# Patient Record
Sex: FEMALE | Race: WHITE | NOT HISPANIC OR LATINO | Employment: PART TIME | URBAN - METROPOLITAN AREA
[De-identification: names, ages, dates, MRNs, and addresses within clinical notes are randomized per-mention and may not be internally consistent; named-entity substitution may affect disease eponyms.]

---

## 2020-06-08 ENCOUNTER — TELEPHONE (OUTPATIENT)
Dept: PODIATRY | Facility: CLINIC | Age: 70
End: 2020-06-08

## 2020-06-08 NOTE — TELEPHONE ENCOUNTER
----- Message from Natasha Arias sent at 6/8/2020  1:37 PM CDT -----  Contact: patient  Type: Needs Medical Advice  Who Called:  Patient  Best Call Back Number: [phone]  Additional Information: Patient states that she is sending a referral to office for new patient apt.  Please call to advise.  Thanks!

## 2020-06-16 ENCOUNTER — OFFICE VISIT (OUTPATIENT)
Dept: PODIATRY | Facility: CLINIC | Age: 70
End: 2020-06-16
Payer: MEDICARE

## 2020-06-16 VITALS
DIASTOLIC BLOOD PRESSURE: 77 MMHG | RESPIRATION RATE: 18 BRPM | SYSTOLIC BLOOD PRESSURE: 131 MMHG | HEIGHT: 65 IN | HEART RATE: 62 BPM | TEMPERATURE: 99 F | WEIGHT: 209.63 LBS | OXYGEN SATURATION: 97 % | BODY MASS INDEX: 34.93 KG/M2

## 2020-06-16 DIAGNOSIS — L60.8 ACQUIRED DYSMORPHIC TOENAIL: ICD-10-CM

## 2020-06-16 DIAGNOSIS — R73.03 PREDIABETES: Primary | ICD-10-CM

## 2020-06-16 DIAGNOSIS — R20.8 LOSS OF PROTECTIVE SENSATION OF SKIN OF FOOT: ICD-10-CM

## 2020-06-16 DIAGNOSIS — M79.675 PAIN OF LEFT GREAT TOE: ICD-10-CM

## 2020-06-16 PROCEDURE — 1101F PT FALLS ASSESS-DOCD LE1/YR: CPT | Mod: CPTII,S$GLB,, | Performed by: PODIATRIST

## 2020-06-16 PROCEDURE — 1159F MED LIST DOCD IN RCRD: CPT | Mod: S$GLB,,, | Performed by: PODIATRIST

## 2020-06-16 PROCEDURE — 99203 PR OFFICE/OUTPT VISIT, NEW, LEVL III, 30-44 MIN: ICD-10-PCS | Mod: S$GLB,,, | Performed by: PODIATRIST

## 2020-06-16 PROCEDURE — 1126F AMNT PAIN NOTED NONE PRSNT: CPT | Mod: S$GLB,,, | Performed by: PODIATRIST

## 2020-06-16 PROCEDURE — 99999 PR PBB SHADOW E&M-EST. PATIENT-LVL IV: ICD-10-PCS | Mod: PBBFAC,,, | Performed by: PODIATRIST

## 2020-06-16 PROCEDURE — 99203 OFFICE O/P NEW LOW 30 MIN: CPT | Mod: S$GLB,,, | Performed by: PODIATRIST

## 2020-06-16 PROCEDURE — 1159F PR MEDICATION LIST DOCUMENTED IN MEDICAL RECORD: ICD-10-PCS | Mod: S$GLB,,, | Performed by: PODIATRIST

## 2020-06-16 PROCEDURE — 1126F PR PAIN SEVERITY QUANTIFIED, NO PAIN PRESENT: ICD-10-PCS | Mod: S$GLB,,, | Performed by: PODIATRIST

## 2020-06-16 PROCEDURE — 99999 PR PBB SHADOW E&M-EST. PATIENT-LVL IV: CPT | Mod: PBBFAC,,, | Performed by: PODIATRIST

## 2020-06-16 PROCEDURE — 1101F PR PT FALLS ASSESS DOC 0-1 FALLS W/OUT INJ PAST YR: ICD-10-PCS | Mod: CPTII,S$GLB,, | Performed by: PODIATRIST

## 2020-06-16 RX ORDER — MELOXICAM 15 MG/1
15 TABLET ORAL DAILY
COMMUNITY
End: 2023-08-10

## 2020-06-16 RX ORDER — OMEPRAZOLE 40 MG/1
40 CAPSULE, DELAYED RELEASE ORAL DAILY
COMMUNITY

## 2020-06-16 RX ORDER — CARBIDOPA AND LEVODOPA 25; 100 MG/1; MG/1
TABLET ORAL
COMMUNITY
Start: 2020-04-13

## 2020-06-16 RX ORDER — LANOLIN ALCOHOL/MO/W.PET/CERES
1 CREAM (GRAM) TOPICAL 2 TIMES DAILY
COMMUNITY

## 2020-06-16 RX ORDER — METOPROLOL TARTRATE 50 MG/1
TABLET ORAL
COMMUNITY
Start: 2020-04-13

## 2020-06-16 RX ORDER — MONTELUKAST SODIUM 10 MG/1
10 TABLET ORAL NIGHTLY
COMMUNITY
End: 2023-08-10

## 2020-06-16 RX ORDER — PROMETHAZINE HYDROCHLORIDE AND DEXTROMETHORPHAN HYDROBROMIDE 6.25; 15 MG/5ML; MG/5ML
SYRUP ORAL
COMMUNITY
End: 2023-08-10

## 2020-06-16 RX ORDER — CHOLECALCIFEROL (VITAMIN D3) 25 MCG
5000 TABLET ORAL DAILY
COMMUNITY

## 2020-06-16 RX ORDER — LOSARTAN POTASSIUM AND HYDROCHLOROTHIAZIDE 12.5; 1 MG/1; MG/1
TABLET ORAL
COMMUNITY
Start: 2020-06-08

## 2020-06-16 RX ORDER — SIMVASTATIN 20 MG/1
20 TABLET, FILM COATED ORAL NIGHTLY
COMMUNITY
End: 2023-08-10

## 2020-06-16 NOTE — LETTER
June 17, 2020      Nasrin Woodson MD  [address]  [place]  [facility] [place] [address]           [facility]  [address] [facility] [place] [address]  Phone: [phone]  Fax: [phone]          Patient: Katrin Gomez   MR Number: [id number]   YOB: 1950   Date of Visit: 6/16/2020       Dear Dr. Nasrin Woodson:    Thank you for referring Katrin Gomez to me for evaluation. Attached you will find relevant portions of my assessment and plan of care.    If you have questions, please do not hesitate to call me. I look forward to following Katrin Gomez along with you.    Sincerely,    Tracee Sims, DPM    Enclosure  CC:  No Recipients    If you would like to receive this communication electronically, please contact [email or web address] or [phone] to request more information on [employer] Link access.    For providers and/or their staff who would like to refer a patient to [facility], please contact us through our one-stop-shop provider referral line, [facility], at [phone].    If you feel you have received this communication in error or would no longer like to receive these types of communications, please e-mail [email or web address]

## 2020-06-17 NOTE — PROGRESS NOTES
Subjective:       Patient ID: Katrin Gomez is a 70 y.o. female.    Chief Complaint: Diabetes Mellitus  Patient presents via referral Dr. Woodson for diabetic foot exam and evaluation of fungal nails.    Has a history of injury right great toe, steel pipe fell on it > 20 years ago. Reports having ingrown nails cut out both sides both big toes 13 years ago.  Reports nail on the right great toe has never grown in properly, will have pain in this area from time to time.  Patient reports  Dr. Chintan kim has diagnosed her as prediabetic, watching diet,  blood work, no medication at this time.  Denies burning or tingling in feet but does have a history of spinal stenosis, sciatica on the left side,  2nd and 3rd digits always feel numb left foot.      Past Medical History:   Diagnosis Date    Abnormal glucose     Hyperlipidemia     Hypertension     Low serum vitamin D     Lumbar radiculitis     Chronic    Osteopenia     Prediabetes     Restless leg syndrome     Spinal stenosis      Past Surgical History:   Procedure Laterality Date    TUMOR REMOVAL      40 years ago     Family History   Problem Relation Age of Onset    Hypertension Mother     Diabetes Mother     Hypertension Father     Diabetes Father      Social History     Socioeconomic History    Marital status:      Spouse name: Not on file    Number of children: Not on file    Years of education: Not on file    Highest education level: Not on file   Occupational History    Occupation: [employer]    Social Needs    Financial resource strain: Not on file    Food insecurity     Worry: Not on file     Inability: Not on file    Transportation needs     Medical: Not on file     Non-medical: Not on file   Tobacco Use    Smoking status: Former Smoker    Smokeless tobacco: Never Used   Substance and Sexual Activity    Alcohol use: Not Currently    Drug use: Never    Sexual activity: Not Currently   Lifestyle    Physical activity     Days per  "week: Not on file     Minutes per session: Not on file    Stress: Not on file   Relationships    Social connections     Talks on phone: Not on file     Gets together: Not on file     Attends Muslim service: Not on file     Active member of club or organization: Not on file     Attends meetings of clubs or organizations: Not on file     Relationship status: Not on file   Other Topics Concern    Not on file   Social History Narrative    Not on file       Current Outpatient Medications   Medication Sig Dispense Refill    calcium citrate-vitamin D3 315-200 mg (CITRACAL+D) 315-200 mg-unit per tablet Take 1 tablet by mouth 2 (two) times daily.      carbidopa-levodopa  mg (SINEMET)  mg per tablet       losartan-hydrochlorothiazide 100-12.5 mg (HYZAAR) 100-12.5 mg Tab       meloxicam (MOBIC) 15 MG tablet Take 15 mg by mouth once daily.      metoprolol tartrate (LOPRESSOR) 50 MG tablet       montelukast (SINGULAIR) 10 mg tablet Take 10 mg by mouth every evening.      omeprazole (PRILOSEC) 40 MG capsule Take 40 mg by mouth once daily. Delayed release capsule      promethazine-dextromethorphan (PROMETHAZINE-DM) 6.25-15 mg/5 mL Syrp Take by mouth.      simvastatin (ZOCOR) 20 MG tablet Take 20 mg by mouth every evening.      vitamin D (VITAMIN D3) 1000 units Tab Take 5,000 Units by mouth once daily.       No current facility-administered medications for this visit.      Review of patient's allergies indicates:  No Known Allergies    Review of Systems   Constitutional: Negative for fever.   HENT: Negative for congestion.    Respiratory: Negative for cough and shortness of breath.    Cardiovascular: Negative for leg swelling.   Musculoskeletal: Negative for gait problem.   All other systems reviewed and are negative.      Objective:      Vitals:    06/16/20 1151   BP: 131/77   Pulse: 62   Resp: 18   Temp: 98.7 °F (37.1 °C)   TempSrc: Oral   SpO2: 97%   Weight: 95.1 kg (209 lb 9.6 oz)   Height: 5' 5" " (1.651 m)     Physical Exam  Vitals signs and nursing note reviewed.   Constitutional:       General: She is not in acute distress.     Appearance: Normal appearance.   Cardiovascular:      Pulses:           Dorsalis pedis pulses are 2+ on the right side and 2+ on the left side.        Posterior tibial pulses are 2+ on the right side and 2+ on the left side.   Pulmonary:      Effort: Pulmonary effort is normal.   Musculoskeletal:      Right foot: Normal range of motion. No deformity.      Left foot: Normal range of motion. No deformity.   Feet:      Right foot:      Protective Sensation: 6 sites tested. 6 sites sensed.      Skin integrity: No skin breakdown, erythema, callus or dry skin.      Toenail Condition: Right toenails are abnormally thick.      Left foot:      Protective Sensation: 6 sites tested. 4 sites sensed.      Skin integrity: No skin breakdown, erythema, callus or dry skin.   Skin:     Capillary Refill: Capillary refill takes 2 to 3 seconds.   Psychiatric:         Behavior: Behavior normal.       Vascular         Normal CFT bilateral   No lower extremity edema bilateral   Pedal skin temperature and color are normal bilateral     Integumentary   Damaged, tented, dystrophic and irregular nail right hallux, tender upon palpation due to thickness.  Upon reducing thickness no evidence of infection.  Similar condition of the left hallux, not as severe,  better shape to the nail plate, no pain       Soft skin texture  No skin breaks, bruises, abrasions bilateral feet    Neurological   Gross sensation intact in all areas bilateral feet with monofilament testing with exception of 2nd and 3rd digits left foot.  Consistent with spinal stenosis, sciatica and lumbar radiculopathy     Musculoskeletal   Muscle Strength/Testing and Tone:  Intact, normal tone bilateral   Joints, Bones, and Muscles: Normal with normal ROM for age        Walks well unassisted        Presents in appropriate shoes       Assessment:        1. Prediabetes    2. Loss of protective sensation of skin of foot - Left Foot    3. Pain of left great toe    4. Acquired dysmorphic toenail - Right Foot        Plan:         Diabetic pedal exam performed.    Reviewed diabetic education.   Reviewed results monofilament testing, lack of sensation 2nd 3rd digit left foot.  Advised patient this is definitely back related, loss of sensation due to diabetes typically starts in the big toes, throughout digits and works up the foot to the lower legs slowly.  Discussed benefit of tight control of glucose/diabetes,  diet, exercise, weight loss to avoid taking medication and to prevent any further progression regarding neuropathy or loss of sensation to feet  Reviewed appropriate shoes,  especially indoors to protect feet, no flat shoes, slippers or walking in sock or bare feet.   Discussed maintenance of skin and nails.  Reviewed damaged, dystrophic and irregular shaped nail plate right hallux which puts her at risk for subungual abscess as well as ingrown nail.  Thickness was reduced and advised patient on how to maintain nail better to prevent recurrence and pain.  We discussed topicals to help soften the nail to prevent pain and trim nail easier.  Would recommend on the left great toe even though it is not as severe.    Reviewed need for daily foot checks and instructed patient to contact the office with any area of redness or swelling which has not improved within 3 days.  Patient was in understanding and agreement with treatment plan.  I counseled the patient on their conditions, implications and medical management.  Instructed patient/family to contact the office with any changes, questions, concerns, worsening of symptoms.   Total face-to-face time, exam, assessment, treatment, discussion, documentation 30 minutes, more than half this time spent on consultation and coordination of care.  Follow up as needed    This note was created using M[employer] voice recognition  software that occasionally misinterpreted phrases or words.

## 2021-03-08 ENCOUNTER — IMMUNIZATION (OUTPATIENT)
Dept: FAMILY MEDICINE | Facility: CLINIC | Age: 71
End: 2021-03-08
Payer: MEDICARE

## 2021-03-08 DIAGNOSIS — Z23 NEED FOR VACCINATION: Primary | ICD-10-CM

## 2021-03-08 PROCEDURE — 91301 COVID-19, MRNA, LNP-S, PF, 100 MCG/0.5 ML DOSE VACCINE: ICD-10-PCS | Mod: S$GLB,,, | Performed by: FAMILY MEDICINE

## 2021-03-08 PROCEDURE — 0011A COVID-19, MRNA, LNP-S, PF, 100 MCG/0.5 ML DOSE VACCINE: ICD-10-PCS | Mod: CV19,S$GLB,, | Performed by: FAMILY MEDICINE

## 2021-03-08 PROCEDURE — 91301 COVID-19, MRNA, LNP-S, PF, 100 MCG/0.5 ML DOSE VACCINE: CPT | Mod: S$GLB,,, | Performed by: FAMILY MEDICINE

## 2021-03-08 PROCEDURE — 0011A COVID-19, MRNA, LNP-S, PF, 100 MCG/0.5 ML DOSE VACCINE: CPT | Mod: CV19,S$GLB,, | Performed by: FAMILY MEDICINE

## 2021-04-05 ENCOUNTER — IMMUNIZATION (OUTPATIENT)
Dept: FAMILY MEDICINE | Facility: CLINIC | Age: 71
End: 2021-04-05
Payer: MEDICARE

## 2021-04-05 DIAGNOSIS — Z23 NEED FOR VACCINATION: Primary | ICD-10-CM

## 2021-04-05 PROCEDURE — 0012A COVID-19, MRNA, LNP-S, PF, 100 MCG/0.5 ML DOSE VACCINE: ICD-10-PCS | Mod: CV19,S$GLB,, | Performed by: FAMILY MEDICINE

## 2021-04-05 PROCEDURE — 0012A COVID-19, MRNA, LNP-S, PF, 100 MCG/0.5 ML DOSE VACCINE: CPT | Mod: CV19,S$GLB,, | Performed by: FAMILY MEDICINE

## 2021-04-05 PROCEDURE — 91301 COVID-19, MRNA, LNP-S, PF, 100 MCG/0.5 ML DOSE VACCINE: CPT | Mod: S$GLB,,, | Performed by: FAMILY MEDICINE

## 2021-04-05 PROCEDURE — 91301 COVID-19, MRNA, LNP-S, PF, 100 MCG/0.5 ML DOSE VACCINE: ICD-10-PCS | Mod: S$GLB,,, | Performed by: FAMILY MEDICINE

## 2022-02-28 ENCOUNTER — IMMUNIZATION (OUTPATIENT)
Dept: FAMILY MEDICINE | Facility: CLINIC | Age: 72
End: 2022-02-28
Payer: MEDICARE

## 2022-02-28 DIAGNOSIS — Z23 NEED FOR VACCINATION: Primary | ICD-10-CM

## 2022-02-28 PROCEDURE — 91306 COVID-19, MRNA, LNP-S, PF, 100 MCG/0.25 ML DOSE VACCINE (MODERNA BOOSTER): CPT | Mod: PBBFAC | Performed by: FAMILY MEDICINE

## 2022-02-28 PROCEDURE — 0064A COVID-19, MRNA, LNP-S, PF, 100 MCG/0.25 ML DOSE VACCINE (MODERNA BOOSTER): CPT | Mod: PBBFAC | Performed by: FAMILY MEDICINE

## 2022-02-28 NOTE — PROGRESS NOTES
Katrin Gomez arrive to clinic awake and alert.  Pt verified name and .   Allergies reviewed with patient.  Pt given Moderna 0.25mg LD  per provider orders.      Denied further needs.    Patient instructed to wait 15 mins after injection.   Patient states no vaccines in the last 14 days.  Vaccine information sheet was given to patient. Patient voiced understanding.    Luna Cody, SURESHN

## 2023-08-08 ENCOUNTER — HOSPITAL ENCOUNTER (OUTPATIENT)
Dept: RADIOLOGY | Facility: HOSPITAL | Age: 73
Discharge: HOME | End: 2023-08-08
Attending: PODIATRIST
Payer: MEDICARE

## 2023-08-08 ENCOUNTER — OFFICE VISIT (OUTPATIENT)
Dept: PODIATRY | Facility: CLINIC | Age: 73
End: 2023-08-08
Payer: MEDICARE

## 2023-08-08 VITALS
BODY MASS INDEX: 33.66 KG/M2 | WEIGHT: 202 LBS | HEIGHT: 65 IN | HEART RATE: 62 BPM | RESPIRATION RATE: 18 BRPM | SYSTOLIC BLOOD PRESSURE: 112 MMHG | DIASTOLIC BLOOD PRESSURE: 73 MMHG

## 2023-08-08 DIAGNOSIS — M25.572 LEFT LATERAL ANKLE PAIN: Primary | ICD-10-CM

## 2023-08-08 DIAGNOSIS — M25.572 LEFT LATERAL ANKLE PAIN: ICD-10-CM

## 2023-08-08 DIAGNOSIS — S90.222S: ICD-10-CM

## 2023-08-08 DIAGNOSIS — M20.41 HAMMERTOE OF SECOND TOE OF RIGHT FOOT: ICD-10-CM

## 2023-08-08 PROCEDURE — 3078F DIAST BP <80 MM HG: CPT | Mod: CPTII,S$GLB,, | Performed by: PODIATRIST

## 2023-08-08 PROCEDURE — 73610 XR ANKLE COMPLETE 3 VIEW LEFT: ICD-10-PCS | Mod: 26,LT,, | Performed by: RADIOLOGY

## 2023-08-08 PROCEDURE — 99214 PR OFFICE/OUTPT VISIT, EST, LEVL IV, 30-39 MIN: ICD-10-PCS | Mod: S$GLB,,, | Performed by: PODIATRIST

## 2023-08-08 PROCEDURE — 99999 PR PBB SHADOW E&M-EST. PATIENT-LVL IV: CPT | Mod: PBBFAC,,, | Performed by: PODIATRIST

## 2023-08-08 PROCEDURE — 3078F PR MOST RECENT DIASTOLIC BLOOD PRESSURE < 80 MM HG: ICD-10-PCS | Mod: CPTII,S$GLB,, | Performed by: PODIATRIST

## 2023-08-08 PROCEDURE — 73610 X-RAY EXAM OF ANKLE: CPT | Mod: 26,LT,, | Performed by: RADIOLOGY

## 2023-08-08 PROCEDURE — 1159F MED LIST DOCD IN RCRD: CPT | Mod: CPTII,S$GLB,, | Performed by: PODIATRIST

## 2023-08-08 PROCEDURE — 73610 X-RAY EXAM OF ANKLE: CPT | Mod: TC,LT

## 2023-08-08 PROCEDURE — 1159F PR MEDICATION LIST DOCUMENTED IN MEDICAL RECORD: ICD-10-PCS | Mod: CPTII,S$GLB,, | Performed by: PODIATRIST

## 2023-08-08 PROCEDURE — 3074F PR MOST RECENT SYSTOLIC BLOOD PRESSURE < 130 MM HG: ICD-10-PCS | Mod: CPTII,S$GLB,, | Performed by: PODIATRIST

## 2023-08-08 PROCEDURE — 3008F PR BODY MASS INDEX (BMI) DOCUMENTED: ICD-10-PCS | Mod: CPTII,S$GLB,, | Performed by: PODIATRIST

## 2023-08-08 PROCEDURE — 99999 PR PBB SHADOW E&M-EST. PATIENT-LVL IV: ICD-10-PCS | Mod: PBBFAC,,, | Performed by: PODIATRIST

## 2023-08-08 PROCEDURE — 99214 OFFICE O/P EST MOD 30 MIN: CPT | Mod: S$GLB,,, | Performed by: PODIATRIST

## 2023-08-08 PROCEDURE — 3074F SYST BP LT 130 MM HG: CPT | Mod: CPTII,S$GLB,, | Performed by: PODIATRIST

## 2023-08-08 PROCEDURE — 1125F PR PAIN SEVERITY QUANTIFIED, PAIN PRESENT: ICD-10-PCS | Mod: CPTII,S$GLB,, | Performed by: PODIATRIST

## 2023-08-08 PROCEDURE — 1125F AMNT PAIN NOTED PAIN PRSNT: CPT | Mod: CPTII,S$GLB,, | Performed by: PODIATRIST

## 2023-08-08 PROCEDURE — 3008F BODY MASS INDEX DOCD: CPT | Mod: CPTII,S$GLB,, | Performed by: PODIATRIST

## 2023-08-08 RX ORDER — GABAPENTIN 300 MG/1
300 CAPSULE ORAL
COMMUNITY
Start: 2021-10-25 | End: 2023-08-10

## 2023-08-08 RX ORDER — ALBUTEROL SULFATE 90 UG/1
2 AEROSOL, METERED RESPIRATORY (INHALATION)
COMMUNITY
Start: 2022-10-25

## 2023-08-08 RX ORDER — ALBUTEROL SULFATE 0.83 MG/ML
2.5 SOLUTION RESPIRATORY (INHALATION)
COMMUNITY
Start: 2023-06-13 | End: 2023-08-10

## 2023-08-08 RX ORDER — ROSUVASTATIN CALCIUM 5 MG/1
TABLET, COATED ORAL
COMMUNITY
Start: 2023-06-15 | End: 2023-08-10

## 2023-08-08 RX ORDER — FLUCONAZOLE 200 MG/1
TABLET ORAL
COMMUNITY
Start: 2023-06-13 | End: 2023-08-10

## 2023-08-08 RX ORDER — PRAMIPEXOLE DIHYDROCHLORIDE 0.5 MG/1
TABLET ORAL
COMMUNITY
Start: 2023-07-03

## 2023-08-08 RX ORDER — CLOTRIMAZOLE AND BETAMETHASONE DIPROPIONATE 10; .64 MG/G; MG/G
CREAM TOPICAL
COMMUNITY
Start: 2022-12-01 | End: 2023-08-10

## 2023-08-08 RX ORDER — DICLOFENAC SODIUM 75 MG/1
TABLET, DELAYED RELEASE ORAL
COMMUNITY
Start: 2021-12-29

## 2023-08-08 RX ORDER — ALBUTEROL SULFATE 0.83 MG/ML
SOLUTION RESPIRATORY (INHALATION)
COMMUNITY
Start: 2023-06-13 | End: 2023-08-10

## 2023-08-08 RX ORDER — DICLOFENAC SODIUM 75 MG/1
75 TABLET, DELAYED RELEASE ORAL 2 TIMES DAILY
COMMUNITY
Start: 2023-06-19 | End: 2023-08-10

## 2023-08-10 NOTE — PROGRESS NOTES
Subjective:       Patient ID: Katrin Gomez is a 73 y.o. female.    Chief Complaint: Ankle Pain, Nail Problem, and Callouses  Patient presents with complaint of pain on the outside of the left ankle with swelling that has been progressing.  With history of osteopenia she is concerned regarding fracture.  Relates history of pre diabetes although she does confirm neuropathy with a history of spinal stenosis and severe degenerative arthritis of her knees.  Patient inquires about very bruised left great toenail, no drainage or pain.  Pain level left ankle 6/10      Past Medical History:   Diagnosis Date    Abnormal glucose     Hyperlipidemia     Hypertension     Low serum vitamin D     Lumbar radiculitis     Chronic    Osteopenia     Prediabetes     Restless leg syndrome     Spinal stenosis      Past Surgical History:   Procedure Laterality Date    TUMOR REMOVAL      40 years ago     Family History   Problem Relation Age of Onset    Hypertension Mother     Diabetes Mother     Hypertension Father     Diabetes Father      Social History     Socioeconomic History    Marital status:    Occupational History    Occupation: [employer]    Tobacco Use    Smoking status: Former     Current packs/day: 0.00    Smokeless tobacco: Never   Substance and Sexual Activity    Alcohol use: Not Currently    Drug use: Never    Sexual activity: Not Currently       Current Outpatient Medications   Medication Sig Dispense Refill    albuterol (VENTOLIN HFA) 90 mcg/actuation inhaler 2 puffs.      calcium citrate-vitamin D3 315-200 mg (CITRACAL+D) 315-200 mg-unit per tablet Take 1 tablet by mouth 2 (two) times daily.      carbidopa-levodopa  mg (SINEMET)  mg per tablet       diclofenac (VOLTAREN) 75 MG EC tablet   = 1 tab, Oral, BID with meals, PRN baaaaack and joint pain, # 180 tab, 3 Refill(s), other reason (Rx)      losartan-hydrochlorothiazide 100-12.5 mg (HYZAAR) 100-12.5 mg Tab       metoprolol tartrate (LOPRESSOR) 50 MG  "tablet       omeprazole (PRILOSEC) 40 MG capsule Take 40 mg by mouth once daily. Delayed release capsule      pramipexole (MIRAPEX) 0.5 MG tablet Take by mouth.      vitamin D (VITAMIN D3) 1000 units Tab Take 5,000 Units by mouth once daily.      walker (ULTRA-LIGHT ROLLATOR MISC)   rollator with wheels, See Instructions, needs  walker for  gait instabilty, # 1 EA, 0 Refill(s)       No current facility-administered medications for this visit.     Review of patient's allergies indicates:  No Known Allergies    Review of Systems   Cardiovascular:  Negative for leg swelling.   Musculoskeletal:  Negative for gait problem.   All other systems reviewed and are negative.      Objective:      Vitals:    08/08/23 0932   BP: 112/73   Pulse: 62   Resp: 18   Weight: 91.6 kg (202 lb)   Height: 5' 5" (1.651 m)     Physical Exam  Vitals and nursing note reviewed.   Constitutional:       General: She is not in acute distress.     Appearance: Normal appearance.   Cardiovascular:      Pulses:           Dorsalis pedis pulses are 2+ on the right side and 2+ on the left side.        Posterior tibial pulses are 2+ on the right side and 2+ on the left side.   Pulmonary:      Effort: Pulmonary effort is normal.   Musculoskeletal:         General: Tenderness present.      Left foot: Deformity (mild hammertoe 2nd right) present.      Comments: Diffuse lateral left ankle pain, minimal edema,a no erythema or calor   Feet:      Right foot:      Skin integrity: No skin breakdown (healing skin dorsal 2nd digit right).      Left foot:      Skin integrity: No erythema (bruising with fungal involvement, partially black left hallux nail, no infection, no pain).      Toenail Condition: Left toenails are abnormally thick. Fungal disease present.  Skin:     Capillary Refill: Capillary refill takes 2 to 3 seconds.   Neurological:      Mental Status: She is alert.   Psychiatric:         Behavior: Behavior normal.         Thought Content: Thought content " normal.                        Assessment:       1. Left lateral ankle pain    2. Toenail bruise, left, sequela    3. Hammertoe of second toe of right foot        Plan:         X-RAY ANKLE LEFT COMPLETE    Reviewed x-ray with patient, no evidence of fracture but had a lengthy discussion regarding some tenderness of the ligaments, this can be due to inflammation, we discussed a mild sprain  Had a lengthy discussion regarding appropriate shoes, support, ankle sleeve  Reviewed ice/cool therapy in frequency this should be performed  Reviewed over-the-counter Voltaren gel as directed  Advised patient for best results all of these treatments should be utilized along with appropriate tennis shoes for support both indoors and out  Pain should be gone within 4 weeks, if not or any increase in pain it needs to be followed up with again in the office.  Patient verbalized understanding  Reviewed pre diabetes, potential complications  Reviewed diabetic education  Discussed mild hammertoe 2nd digit right foot in the small sore on the top of the toe which most likely was a blister.  Area is healing well, keep clean and dry  Reviewed appropriate shoes at length  Discussed fungal involvement of left great toenail most likely due to an injury as there is evidence of dried blood within the nail.  There is also evidence of fungal infection.  Showed patient how to reduce thickness of nail weekly and we discussed multiple topical treatments.  Must be applied daily until the nail grows out completely, can take up to 6 months.  Also reviewed soaking regimens  Reviewed the importance of daily foot checks, contact office with any area of concern which has not improved in a few days  Patient was in understanding and agreement with treatment plan.  I counseled the patient on their conditions, implications and medical management.  Instructed patient/family to contact the office with any changes, questions, concerns, worsening of symptoms.    Total face to face time 30 minutes, exam, assessment, treatment, discussion, additional time for review of chart prior to and following appointment and visit documentation, consultation and coordination of care.    Follow up as needed    This note was created using [employer] voice recognition software that occasionally misinterpreted phrases or words.

## 2025-03-20 ENCOUNTER — TELEPHONE (OUTPATIENT)
Dept: PULMONOLOGY | Facility: CLINIC | Age: 75
End: 2025-03-20
Payer: MEDICARE

## 2025-03-20 DIAGNOSIS — J44.9 CHRONIC OBSTRUCTIVE PULMONARY DISEASE, UNSPECIFIED COPD TYPE: Primary | ICD-10-CM

## 2025-03-20 NOTE — TELEPHONE ENCOUNTER
----- Message from Anastasiia sent at 3/20/2025  8:56 AM CDT -----  Patient is requesting a sooner appointment.  Patient declined first available appointment listed as well as another facility and provider .  Patient will not accept being placed on the waitlist and is requesting a message be sent to doctor. Name of Caller: Daughter  When is the first available appointment?  Symptoms: sleep apnea and cpd  Would the patient rather a call back or a response via [facility]? Call back  Best Call Back Number: [phone] Additional Information:

## 2025-03-20 NOTE — TELEPHONE ENCOUNTER
----- Message from Anastasiia sent at 3/20/2025  8:56 AM CDT -----  Patient is requesting a sooner appointment.  Patient declined first available appointment listed as well as another facility and provider .  Patient will not accept being placed on the waitlist and is requesting a message be sent to doctor. Name of Caller: Daughter  When is the first available appointment?  Symptoms: sleep apnea and cpd  Would the patient rather a call back or a response via [facility]? Call back  Best Call Back Number: [phone] Additional Information:

## 2025-03-20 NOTE — TELEPHONE ENCOUNTER
I spoke with patient's daughter n law, Mrs Stevenson to schedule patient with Dr Taylor on 3/28/25 at 11am with pft's at 9:30am. Appointment mailed. Mrs Stevenson confirmed and verbalized understanding.

## 2025-04-10 ENCOUNTER — HOSPITAL ENCOUNTER (EMERGENCY)
Facility: HOSPITAL | Age: 75
Discharge: HOME | End: 2025-04-10
Attending: EMERGENCY MEDICINE
Payer: MEDICARE

## 2025-04-10 VITALS
RESPIRATION RATE: 20 BRPM | SYSTOLIC BLOOD PRESSURE: 110 MMHG | BODY MASS INDEX: 35.84 KG/M2 | HEART RATE: 89 BPM | WEIGHT: 223 LBS | DIASTOLIC BLOOD PRESSURE: 56 MMHG | TEMPERATURE: 98 F | HEIGHT: 66 IN | OXYGEN SATURATION: 95 %

## 2025-04-10 DIAGNOSIS — Z96.651 STATUS POST RIGHT KNEE REPLACEMENT: ICD-10-CM

## 2025-04-10 DIAGNOSIS — M79.89 CALF SWELLING: ICD-10-CM

## 2025-04-10 DIAGNOSIS — L03.90 CELLULITIS, UNSPECIFIED CELLULITIS SITE: Primary | ICD-10-CM

## 2025-04-10 PROCEDURE — 99284 EMERGENCY DEPT VISIT MOD MDM: CPT | Mod: 25

## 2025-04-10 PROCEDURE — 25000003 PHARM REV CODE 250: Performed by: EMERGENCY MEDICINE

## 2025-04-10 RX ORDER — CEPHALEXIN 250 MG/1
500 CAPSULE ORAL
Status: COMPLETED | OUTPATIENT
Start: 2025-04-10 | End: 2025-04-10

## 2025-04-10 RX ORDER — CEPHALEXIN 500 MG/1
500 CAPSULE ORAL EVERY 6 HOURS
Qty: 28 CAPSULE | Refills: 0 | Status: SHIPPED | OUTPATIENT
Start: 2025-04-10 | End: 2025-04-17

## 2025-04-10 RX ADMIN — CEPHALEXIN 500 MG: 250 CAPSULE ORAL at 06:04

## 2025-04-10 NOTE — DISCHARGE INSTRUCTIONS
Thank you for coming to our Emergency Department today. It is important to remember that some problems are difficult to diagnose and may not be found during your Emergency Department visit. Be sure to follow up with your primary care doctor and review all labs/imaging/tests that were performed during this visit with them. Some labs/tests may be outside of the normal range and require non-emergent follow-up and further investigation to help diagnose/exclude/prevent complications or other medical conditions.    If you do not have a primary care doctor, you may contact the one listed on your discharge paperwork or you may also call the [facility] Appointment Desk at [phone] to schedule an appointment and establish care with one. It is important to your health that you have a primary care doctor.    Medicaid Escalation Line:   [phone] - Please contact this number if you are having difficulty getting follow up with a Primary Care Provider or Speciality Provider.     Please take all medications as directed. All medications may potentially have side-effects and it is impossible to predict which medications may give you side-effects or what side-effects (if any) they will give you.. If you feel that you are having a negative effect or side-effect of any medication you should immediately stop taking them and seek medical attention. If you feel that you are having a life-threatening reaction call [phone].    Return to the ER with any questions/concerns, new/concerning symptoms, worsening or failure to improve.     Do not drive, swim, climb to height, take a bath or make any important decisions for 24 hours if you have received any pain medications, sedatives or mood altering drugs during your ER visit.

## 2025-04-10 NOTE — ED PROVIDER NOTES
Encounter Date: 4/10/2025       History     Chief Complaint   Patient presents with    Abnormal Lab     Pt arrived in ED, c/o being sent by surgeon due to possible blood clot. Pt reports being told D dimer was 2.96. Pt had right knee replacement surgery 3 weeks ago and is c/o right calf pain and tenderness. Pt denies any CP, abd pain or n/v/d. Pt has hx of COPD so reports chronic SOB.      74-year-old female with history of COPD, hypertension, restless leg syndrome presents to the ED to rule out DVT.  Patient saw her primary care provider earlier today, she was specifically asked about calf pain, she states she had some right lower calf pain at the time and a D-dimer was ordered an elevated.  She reports she has had some swelling since her knee replacement several weeks ago with Dr. Motta at [facility].  She reports chronic shortness of breath, she thought it was related to having to strain to walk on her right knee.  She is not sure if she has noticed any change in her shortness of breath since her procedure 3 weeks ago.  She has no chest pain.  She reports some redness and hardness around the incision of the right knee.    The history is provided by the patient.     Review of patient's allergies indicates:  No Known Allergies  Past Medical History:   Diagnosis Date    Abnormal glucose     Hyperlipidemia     Hypertension     Low serum vitamin D     Lumbar radiculitis     Chronic    Osteopenia     Prediabetes     Restless leg syndrome     Spinal stenosis      Past Surgical History:   Procedure Laterality Date    TUMOR REMOVAL      40 years ago     Family History   Problem Relation Name Age of Onset    Hypertension Mother      Diabetes Mother      Hypertension Father      Diabetes Father       Social History[1]  Review of Systems   Constitutional:  Negative for chills and fever.   HENT:  Negative for congestion.    Eyes:  Negative for visual disturbance.   Respiratory:  Positive for shortness of breath (chronic). Negative  for cough.    Cardiovascular:  Positive for leg swelling. Negative for chest pain.   Gastrointestinal:  Negative for abdominal pain, nausea and vomiting.   Genitourinary:  Negative for dysuria.   Skin:  Negative for rash.   Neurological:  Positive for numbness (patch of numbness on right lower lateral leg distal to knee replacement). Negative for headaches.   Psychiatric/Behavioral:  Negative for decreased concentration.        Physical Exam     Initial Vitals [04/10/25 1733]   BP Pulse Resp Temp SpO2   (!) 170/77 85 (!) 26 97.9 °F (36.6 °C) 97 %      MAP       --         Physical Exam    Nursing note and vitals reviewed.  Constitutional: She appears well-developed and well-nourished. She is not diaphoretic. No distress.   HENT:   Head: Normocephalic and atraumatic.   Eyes: Conjunctivae are normal. Pupils are equal, round, and reactive to light.   Neck: Neck supple.   Cardiovascular:  Normal rate and regular rhythm.           Pulses:       Dorsalis pedis pulses are 2+ on the right side and 2+ on the left side.   Pulmonary/Chest: Breath sounds normal. No respiratory distress.   Abdominal: There is no abdominal tenderness.   Musculoskeletal:      Cervical back: Neck supple.      Comments: RLE: incision to right anterior knee, steri strips in place, very mild erythema surrounding the incision and extending to the right medial knee, mild calor, no crepitus, no drainage from wound. 1+ pitting edema, mild tenderness to the right posterior lower calf.      Neurological: She is alert. GCS score is 15. GCS eye subscore is 4. GCS verbal subscore is 5. GCS motor subscore is 6.   Skin: Skin is warm and dry.   Psychiatric: She has a normal mood and affect.         ED Course   Procedures  Labs Reviewed - No data to display       Imaging Results              US Lower Extremity Veins Right (Final result)  Result time 04/10/25 19:26:53      Final result by Kathie Craven MD (04/10/25 19:26:53)                   Impression:       No evidence of right deep venous thrombosis.    Subcutaneous edema.      Electronically signed by: Kathie Craven  Date:    04/10/2025  Time:    19:26               Narrative:    EXAMINATION:  ULTRASOUND LOWER EXTREMITY VEINS RIGHT    CLINICAL HISTORY:  Other specified soft tissue disordersleg pain;    TECHNIQUE:  Grayscale imaging of the right lower extremity to evaluate the deep and superficial venous system with color Doppler interrogation and Spectral Doppler wave form analysis was performed.    COMPARISON:  None.    FINDINGS:  There is normal color Doppler interrogation, compression and distal augmentation of the right common femoral, femoral, greater saphenous, popliteal, posterior tibial, anterior tibial, and peroneal veins.  Subcutaneous edema is present.                                       Medications   cephALEXin capsule 500 mg (500 mg Oral Given 4/10/25 1801)     Medical Decision Making  74-year-old female presenting to the ED for evaluation of DVT.  Patient is status post right knee replacement 3 weeks ago at [facility] with Dr. Motta, saw PCP this morning and was asked about calf pain which she endorsed, ongoing for several days.  A D-dimer was elevated and she was sent to the ED for US.  The patient reports mild posterior right calf tenderness, she endorses shortness of breath but on discussion this seems like this is a chronic symptom for the patient.  She is concerned that she has a little bit of redness surrounding her wound and states the wound feels hot at times.  On exam, the patient has mild tenderness to the right posterior calf, 1+ pitting edema in the right lower extremity.  The incision on the right anterior knee has no drainage, there is mild surrounding erythema and mild calor, no crepitus.  She has an area of numbness on the right lateral lower leg distal to the incision.  Otherwise, neurovascularly intact.  Differential includes but not limited to cellulitis, DVT, swelling from  postoperative state.  Will start on Keflex, get ultrasound of the right lower extremity veins.    Amount and/or Complexity of Data Reviewed  Radiology: ordered.     Details: Ultrasound negative for DVT, does show some soft tissue edema.    Risk  Prescription drug management.                     Ultrasound negative for DVT.  Will start Keflex for coverage of early cellulitis.  Patient is advised to follow up with her orthopedist for wound check.             This dictation has been generated using M-Modal Fluency Direct dictation; some phonetic errors may occur.       Clinical Impression:  Final diagnoses:  [M79.89] Calf swelling  [L03.90] Cellulitis, unspecified cellulitis site (Primary)  [Z96.651] Status post right knee replacement          ED Disposition Condition    Discharge Stable          ED Prescriptions       Medication Sig Dispense Start Date End Date Auth. Provider    cephALEXin (KEFLEX) 500 MG capsule Take 1 capsule (500 mg total) by mouth every 6 (six) hours. for 7 days 28 capsule 4/10/2025 4/17/2025 Merly Castellanos MD          Follow-up Information       Follow up With Specialties Details Why Contact Info    Arash Motta Jr., MD Orthopedic Surgery Call  for wound check [facility] [address]  [phone]      [facility] Emergency Medicine  As needed, If symptoms worsen [address] [facility] [place] [address] [phone]                 [1]   Social History  Tobacco Use    Smoking status: Former     Types: Vaping with nicotine    Smokeless tobacco: Never   Substance Use Topics    Alcohol use: Not Currently    Drug use: Never        Merly Castellanos MD  04/10/25 1939

## 2025-04-10 NOTE — ED NOTES
Patient presents to ED by her surgeon due to elevated D dimer (possible blood clot), patient reports pain to right leg and SOB when ambulating, currently denies SOB and pain while at rest.